# Patient Record
Sex: MALE | Race: BLACK OR AFRICAN AMERICAN | NOT HISPANIC OR LATINO
[De-identification: names, ages, dates, MRNs, and addresses within clinical notes are randomized per-mention and may not be internally consistent; named-entity substitution may affect disease eponyms.]

---

## 2022-06-27 ENCOUNTER — APPOINTMENT (OUTPATIENT)
Dept: ORTHOPEDIC SURGERY | Facility: CLINIC | Age: 57
End: 2022-06-27
Payer: COMMERCIAL

## 2022-06-27 VITALS — HEIGHT: 73 IN | WEIGHT: 210 LBS | BODY MASS INDEX: 27.83 KG/M2

## 2022-06-27 DIAGNOSIS — Z00.00 ENCOUNTER FOR GENERAL ADULT MEDICAL EXAMINATION W/OUT ABNORMAL FINDINGS: ICD-10-CM

## 2022-06-27 DIAGNOSIS — E78.00 PURE HYPERCHOLESTEROLEMIA, UNSPECIFIED: ICD-10-CM

## 2022-06-27 DIAGNOSIS — I10 ESSENTIAL (PRIMARY) HYPERTENSION: ICD-10-CM

## 2022-06-27 DIAGNOSIS — S92.152A DISPLACED AVULSION FRACTURE (CHIP FRACTURE) OF LEFT TALUS, INITIAL ENCOUNTER FOR CLOSED FRACTURE: ICD-10-CM

## 2022-06-27 DIAGNOSIS — E11.9 TYPE 2 DIABETES MELLITUS W/OUT COMPLICATIONS: ICD-10-CM

## 2022-06-27 PROCEDURE — 73610 X-RAY EXAM OF ANKLE: CPT | Mod: LT

## 2022-06-27 PROCEDURE — 99204 OFFICE O/P NEW MOD 45 MIN: CPT | Mod: 25

## 2022-06-27 PROCEDURE — L4361: CPT | Mod: LT

## 2022-06-27 PROCEDURE — 28430 CLTX TALUS FRACTURE W/O MNPJ: CPT

## 2022-06-27 RX ORDER — SIMVASTATIN 80 MG/1
TABLET, FILM COATED ORAL
Refills: 0 | Status: ACTIVE | COMMUNITY

## 2022-06-27 RX ORDER — GLIMEPIRIDE 4 MG/1
TABLET ORAL
Refills: 0 | Status: ACTIVE | COMMUNITY

## 2022-06-27 RX ORDER — METFORMIN HYDROCHLORIDE 625 MG/1
TABLET ORAL
Refills: 0 | Status: ACTIVE | COMMUNITY

## 2022-06-27 RX ORDER — ENALAPRIL MALEATE 5 MG/1
TABLET ORAL
Refills: 0 | Status: ACTIVE | COMMUNITY

## 2022-06-27 RX ORDER — MELOXICAM 15 MG/1
15 TABLET ORAL
Refills: 0 | Status: ACTIVE | COMMUNITY

## 2022-06-27 NOTE — PHYSICAL EXAM
[5___] : [facility] 5[unfilled]/5 [2+] : posterior tibialis pulse: 2+ [Normal] : saphenous nerve sensation normal [Moderate] : moderate diffused ankle swelling [4___] : eversion 4[unfilled]/5 [Left] : left ankle [Weight -] : weightbearing [] : no pain when stressing lateral tarsal metatarsal joint [FreeTextEntry9] : Avulsion fracture dorsal talus.  [de-identified] : inversion 10 degrees [de-identified] : plantar flexion 30 degrees [de-identified] : eversion 5 degrees [TWNoteComboBox7] : dorsiflexion 10 degrees

## 2022-06-27 NOTE — RETURN TO WORK/SCHOOL
[FreeTextEntry1] : The patient is unable to work at this time. This will be reevaluated at their next office visit.\par

## 2022-06-27 NOTE — HISTORY OF PRESENT ILLNESS
[Sudden] : sudden [5] : 5 [4] : 4 [Dull/Aching] : dull/aching [Localized] : localized [Constant] : constant [Household chores] : household chores [Leisure] : leisure [Work] : work [Social interactions] : social interactions [Rest] : rest [Meds] : meds [Sitting] : sitting [Standing] : standing [Walking] : walking [Stairs] : stairs [de-identified] : Pt is a 57 year old M who presents today for evaluation of their left ankle. Pt states that he slipped in his shower 6/18/22 and twisted his ankle, felt good afterwards but developed pain throughout the day. Went to  where XR showed a talus fx. Pain diffuse throughout the ankle but mostly laterally. Denies previous injury. No N/T. Ice to affected area. No other formal treatment to date. WB in sneakers w/ cane (doesn’t normally use).  [] : Post Surgical Visit: no [FreeTextEntry1] : L ankle

## 2022-06-27 NOTE — ASSESSMENT
[FreeTextEntry1] : Recommend weight bearing as tolerated in cam walker.  Ice to the affected area as needed.  Nsaids prn.\par Elevation encouraged.\par \par Patient was instructed that they can not operate an automatic vehicle while wearing a CAM boot or cast on the right lower extremity. If operating a vehicle that requires use of a clutch, patient may not drive while wearing a CAM boot or cast on the left lower extremity.\par \par Repeat x-ray will be performed at the next office visit.\par

## 2022-07-18 ENCOUNTER — APPOINTMENT (OUTPATIENT)
Dept: ORTHOPEDIC SURGERY | Facility: CLINIC | Age: 57
End: 2022-07-18

## 2022-07-18 PROCEDURE — 73630 X-RAY EXAM OF FOOT: CPT | Mod: LT

## 2022-07-18 PROCEDURE — 99024 POSTOP FOLLOW-UP VISIT: CPT

## 2022-07-18 NOTE — ASSESSMENT
[FreeTextEntry1] : Patient is doing well and he can wean out of the cam walker into a supportive shoe as tolerated.\par \par \par Repeat x-ray of left foot will be performed at the next office visit.\par

## 2022-07-18 NOTE — PHYSICAL EXAM
[4___] : eversion 4[unfilled]/5 [2+] : posterior tibialis pulse: 2+ [Normal] : saphenous nerve sensation normal [Weight -] : weightbearing [Mild] : mild swelling of dorsal foot [NL (40)] : plantar flexion 40 degrees [5___] : dorsiflexion 5[unfilled]/5 [] : no pain when stressing lateral tarsal metatarsal joint [Left] : left foot [Weight -] : weightbearing [The fracture is in acceptable alignment. There is progression in healing seen] : The fracture is in acceptable alignment. There is progression in healing seen [de-identified] : WB in CAM boot.  [FreeTextEntry9] : Avulsion fracture dorsal talus.  [de-identified] : plantar flexion 30 degrees [de-identified] : inversion 20 degrees [de-identified] : eversion 15 degrees [TWNoteComboBox7] : dorsiflexion 15 degrees

## 2022-07-18 NOTE — HISTORY OF PRESENT ILLNESS
[Sudden] : sudden [5] : 5 [4] : 4 [Dull/Aching] : dull/aching [Localized] : localized [Constant] : constant [Household chores] : household chores [Leisure] : leisure [Work] : work [Social interactions] : social interactions [Rest] : rest [Meds] : meds [Sitting] : sitting [Standing] : standing [Walking] : walking [Stairs] : stairs [de-identified] : Pt is a 57 year old M who returns today for f/u of their left ankle after he slipped in his shower 6/18/22 and twisted his ankle, felt good afterwards but developed pain throughout the day. Went to  where XR showed a talus fx. He has been WBAT in CAM boot and reports feeling better.  [] : Post Surgical Visit: no [FreeTextEntry1] : L ankle

## 2022-08-01 ENCOUNTER — APPOINTMENT (OUTPATIENT)
Dept: ORTHOPEDIC SURGERY | Facility: CLINIC | Age: 57
End: 2022-08-01

## 2022-08-01 PROCEDURE — 99024 POSTOP FOLLOW-UP VISIT: CPT

## 2022-08-01 PROCEDURE — 73630 X-RAY EXAM OF FOOT: CPT | Mod: LT

## 2022-08-01 NOTE — HISTORY OF PRESENT ILLNESS
[Sudden] : sudden [5] : 5 [4] : 4 [Dull/Aching] : dull/aching [Localized] : localized [Constant] : constant [Household chores] : household chores [Leisure] : leisure [Work] : work [Social interactions] : social interactions [Rest] : rest [Meds] : meds [Sitting] : sitting [Standing] : standing [Walking] : walking [Stairs] : stairs [de-identified] : Patient returns for his left dorsal talar avulsion fracture from 6/18/22.  He has been been WBAT in a supportive shoes and states he is doing much better. No pain at this time. [] : Post Surgical Visit: no [FreeTextEntry1] : L ankle

## 2022-08-01 NOTE — ASSESSMENT
[FreeTextEntry1] : Patient is doing well and he can return to work full duty.\par Ice/nsaids prn.\par \par \par Repeat x-ray of left foot will be performed at the next office visit.\par

## 2022-08-01 NOTE — RETURN TO WORK/SCHOOL
[FreeTextEntry1] : The patient has been cleared to return to work, full duty and without limitations.\par

## 2022-08-01 NOTE — PHYSICAL EXAM
[Mild] : mild swelling of dorsal foot [NL (40)] : plantar flexion 40 degrees [2+] : posterior tibialis pulse: 2+ [Normal] : saphenous nerve sensation normal [Left] : left foot [Weight -] : weightbearing [The fracture is in acceptable alignment. There is progression in healing seen] : The fracture is in acceptable alignment. There is progression in healing seen [5___] : eversion 5[unfilled]/5 [] : no pain when stressing lateral tarsal metatarsal joint [de-identified] : WB in CAM boot.  [FreeTextEntry9] : Avulsion fracture dorsal talus. No change in alignment.  No callus present yet. [de-identified] : inversion 20 degrees [de-identified] : eversion 15 degrees [TWNoteComboBox7] : dorsiflexion 15 degrees

## 2022-09-09 ENCOUNTER — APPOINTMENT (OUTPATIENT)
Dept: ORTHOPEDIC SURGERY | Facility: CLINIC | Age: 57
End: 2022-09-09

## 2022-09-09 DIAGNOSIS — S92.152D DISPLACED AVULSION FRACTURE (CHIP FRACTURE) OF LEFT TALUS, SUBSEQUENT ENCOUNTER FOR FRACTURE WITH ROUTINE HEALING: ICD-10-CM

## 2022-09-09 PROCEDURE — 73630 X-RAY EXAM OF FOOT: CPT | Mod: LT

## 2022-09-09 PROCEDURE — 99024 POSTOP FOLLOW-UP VISIT: CPT

## 2022-09-09 NOTE — ASSESSMENT
[FreeTextEntry1] : Pt. is clinically healed.\par The patient has resolved their injury.\par They can increase activities as tolerated.\par WBAT in supportive footwear is recommended.\par If any issues, return to office.\par

## 2022-09-09 NOTE — PHYSICAL EXAM
[NL (40)] : plantar flexion 40 degrees [5___] : eversion 5[unfilled]/5 [2+] : posterior tibialis pulse: 2+ [Normal] : saphenous nerve sensation normal [Left] : left foot [Weight -] : weightbearing [The fracture is in acceptable alignment. There is progression in healing seen] : The fracture is in acceptable alignment. There is progression in healing seen [NL (20)] : eversion 20 degrees [] : non-antalgic [FreeTextEntry9] : Avulsion fracture dorsal talus still visible.  [de-identified] : inversion 20 degrees [de-identified] : eversion 15 degrees [TWNoteComboBox7] : dorsiflexion 15 degrees

## 2022-09-09 NOTE — HISTORY OF PRESENT ILLNESS
[Sudden] : sudden [5] : 5 [4] : 4 [Dull/Aching] : dull/aching [Localized] : localized [Constant] : constant [Household chores] : household chores [Leisure] : leisure [Work] : work [Social interactions] : social interactions [Rest] : rest [Meds] : meds [Sitting] : sitting [Standing] : standing [Walking] : walking [Stairs] : stairs [de-identified] : Patient returns for his left dorsal talar avulsion fracture from 6/18/22.  He has been been WBAT in a supportive shoes and states he is doing much better. No pain at this time. He is back to all activities without issue.  [] : Post Surgical Visit: no [FreeTextEntry1] : L ankle